# Patient Record
Sex: FEMALE | Race: WHITE
[De-identification: names, ages, dates, MRNs, and addresses within clinical notes are randomized per-mention and may not be internally consistent; named-entity substitution may affect disease eponyms.]

---

## 2018-01-06 ENCOUNTER — HOSPITAL ENCOUNTER (EMERGENCY)
Dept: HOSPITAL 39 - ER | Age: 33
Discharge: HOME | End: 2018-01-06
Payer: COMMERCIAL

## 2018-01-06 VITALS — TEMPERATURE: 97.7 F

## 2018-01-06 VITALS — DIASTOLIC BLOOD PRESSURE: 89 MMHG | SYSTOLIC BLOOD PRESSURE: 125 MMHG

## 2018-01-06 VITALS — OXYGEN SATURATION: 99 %

## 2018-01-06 DIAGNOSIS — Z3A.11: ICD-10-CM

## 2018-01-06 DIAGNOSIS — O9A.311: ICD-10-CM

## 2018-01-06 DIAGNOSIS — O20.0: Primary | ICD-10-CM
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
## 2018-01-09 ENCOUNTER — HOSPITAL ENCOUNTER (OUTPATIENT)
Dept: HOSPITAL 39 - US | Age: 33
Discharge: HOME | End: 2018-01-09
Attending: FAMILY MEDICINE
Payer: COMMERCIAL

## 2018-01-09 DIAGNOSIS — O26.849: Primary | ICD-10-CM

## 2018-01-09 NOTE — US
EXAM DESCRIPTION: 

OB Pregnancy,Early (0-14wks): Ultrasound.



CLINICAL HISTORY: 

Positive pregnancy test.  By LMP, EGA 12 weeks, 0 days. DORIS

7/24/2018. 



COMPARISON: 

None. 



TECHNIQUE: 

Transpelvic scanning through the urine filled bladder: 

2-dimensional and Doppler modes.



FINDINGS: 

Uterus: Well-defined. Not retroverted.

Gestational sac: [place] not measured.

AFV: Subjectively increased.

Fetal pole: Mean CRL measurement 10.4 mm. EGA 7 weeks 1 day.

Yolk sac: None seen.

Subchorionic hemorrhage: Hypoechoic region in the anterior left

inferior aspect abutting the chorion measures 10 x 11 x 5 mm.

Fetal heart tones: Not detected.

Cul-de-sac: No fluid.

Comments: DORIS based on ultrasound CRL measurement is 8/27/2018.

Right ovary dimensions are 2.7 x 2.0 x 1.6 cm. Estimated volume.

Normal Doppler blood flow. No dominant cyst. Small follicles. No

adnexal mass or free fluid.

Left ovary dimensions are 2.1 x 2.0 x 1.3 cm. Estimated volume.

Normal Doppler blood flow. No dominant cyst. Small follicles. No

adnexal mass or free fluid.



IMPRESSION: 

1. Single intrauterine gestation small for dates with increased

amniotic fluid and no fetal heart tones. Probable fetal demise.

Correlate with serial beta hCG measurements. Subchorionic

hemorrhage inferior to the gestational sac. No yolk sac.

2. No adnexal mass or dominant cyst. Bilateral ovaries are

unremarkable. Small follicles. No free fluid.



Electronically signed by:  Phan Lima MD  1/9/2018 5:04 PM CST

Workstation: [id number]